# Patient Record
Sex: FEMALE | Race: WHITE | NOT HISPANIC OR LATINO | Employment: FULL TIME | URBAN - METROPOLITAN AREA
[De-identification: names, ages, dates, MRNs, and addresses within clinical notes are randomized per-mention and may not be internally consistent; named-entity substitution may affect disease eponyms.]

---

## 2024-10-14 ENCOUNTER — OFFICE VISIT (OUTPATIENT)
Dept: PRIMARY CARE | Facility: CLINIC | Age: 19
End: 2024-10-14
Payer: COMMERCIAL

## 2024-10-14 VITALS
SYSTOLIC BLOOD PRESSURE: 138 MMHG | DIASTOLIC BLOOD PRESSURE: 84 MMHG | HEIGHT: 65 IN | OXYGEN SATURATION: 98 % | HEART RATE: 93 BPM | WEIGHT: 226 LBS | TEMPERATURE: 97.9 F | BODY MASS INDEX: 37.65 KG/M2

## 2024-10-14 DIAGNOSIS — N92.6 IRREGULAR MENSES: ICD-10-CM

## 2024-10-14 DIAGNOSIS — G43.009 MIGRAINE WITHOUT AURA AND WITHOUT STATUS MIGRAINOSUS, NOT INTRACTABLE: ICD-10-CM

## 2024-10-14 DIAGNOSIS — Z00.00 ROUTINE GENERAL MEDICAL EXAMINATION AT A HEALTH CARE FACILITY: Primary | ICD-10-CM

## 2024-10-14 DIAGNOSIS — E66.812 CLASS 2 OBESITY WITHOUT SERIOUS COMORBIDITY WITH BODY MASS INDEX (BMI) OF 37.0 TO 37.9 IN ADULT, UNSPECIFIED OBESITY TYPE: ICD-10-CM

## 2024-10-14 PROBLEM — R55 POSTURAL DIZZINESS WITH PRESYNCOPE: Status: ACTIVE | Noted: 2024-10-14

## 2024-10-14 PROBLEM — R42 POSTURAL DIZZINESS WITH PRESYNCOPE: Status: ACTIVE | Noted: 2024-10-14

## 2024-10-14 LAB
25(OH)D3 SERPL-MCNC: 23 NG/ML (ref 30–100)
ALBUMIN SERPL BCP-MCNC: 4.5 G/DL (ref 3.4–5)
ALP SERPL-CCNC: 50 U/L (ref 33–110)
ALT SERPL W P-5'-P-CCNC: 10 U/L (ref 7–45)
ANION GAP SERPL CALCULATED.3IONS-SCNC: 11 MMOL/L (ref 10–20)
AST SERPL W P-5'-P-CCNC: 14 U/L (ref 9–39)
BASOPHILS # BLD AUTO: 0.03 X10*3/UL (ref 0–0.1)
BASOPHILS NFR BLD AUTO: 0.6 %
BILIRUB SERPL-MCNC: 0.3 MG/DL (ref 0–1.2)
BUN SERPL-MCNC: 12 MG/DL (ref 6–23)
CALCIUM SERPL-MCNC: 9.7 MG/DL (ref 8.6–10.3)
CHLORIDE SERPL-SCNC: 105 MMOL/L (ref 98–107)
CHOLEST SERPL-MCNC: 172 MG/DL (ref 0–199)
CHOLEST/HDLC SERPL: 3.9 {RATIO}
CO2 SERPL-SCNC: 27 MMOL/L (ref 21–32)
CREAT SERPL-MCNC: 0.77 MG/DL (ref 0.5–1.05)
EGFRCR SERPLBLD CKD-EPI 2021: >90 ML/MIN/1.73M*2
EOSINOPHIL # BLD AUTO: 0.08 X10*3/UL (ref 0–0.7)
EOSINOPHIL NFR BLD AUTO: 1.6 %
ERYTHROCYTE [DISTWIDTH] IN BLOOD BY AUTOMATED COUNT: 12.8 % (ref 11.5–14.5)
GLUCOSE SERPL-MCNC: 96 MG/DL (ref 74–99)
HCT VFR BLD AUTO: 42.6 % (ref 36–46)
HDLC SERPL-MCNC: 44.2 MG/DL
HGB BLD-MCNC: 14.1 G/DL (ref 12–16)
IMM GRANULOCYTES # BLD AUTO: 0.01 X10*3/UL (ref 0–0.7)
IMM GRANULOCYTES NFR BLD AUTO: 0.2 % (ref 0–0.9)
LDLC SERPL CALC-MCNC: 111 MG/DL
LYMPHOCYTES # BLD AUTO: 1.68 X10*3/UL (ref 1.2–4.8)
LYMPHOCYTES NFR BLD AUTO: 34.1 %
MCH RBC QN AUTO: 29.7 PG (ref 26–34)
MCHC RBC AUTO-ENTMCNC: 33.1 G/DL (ref 32–36)
MCV RBC AUTO: 90 FL (ref 80–100)
MONOCYTES # BLD AUTO: 0.33 X10*3/UL (ref 0.1–1)
MONOCYTES NFR BLD AUTO: 6.7 %
NEUTROPHILS # BLD AUTO: 2.8 X10*3/UL (ref 1.2–7.7)
NEUTROPHILS NFR BLD AUTO: 56.8 %
NON HDL CHOLESTEROL: 128 MG/DL (ref 0–149)
NRBC BLD-RTO: 0 /100 WBCS (ref 0–0)
PLATELET # BLD AUTO: 404 X10*3/UL (ref 150–450)
POTASSIUM SERPL-SCNC: 4.4 MMOL/L (ref 3.5–5.3)
PROT SERPL-MCNC: 7.2 G/DL (ref 6.4–8.2)
RBC # BLD AUTO: 4.75 X10*6/UL (ref 4–5.2)
SODIUM SERPL-SCNC: 139 MMOL/L (ref 136–145)
TRIGL SERPL-MCNC: 83 MG/DL (ref 0–149)
VLDL: 17 MG/DL (ref 0–40)
WBC # BLD AUTO: 4.9 X10*3/UL (ref 4.4–11.3)

## 2024-10-14 PROCEDURE — 36415 COLL VENOUS BLD VENIPUNCTURE: CPT | Performed by: REGISTERED NURSE

## 2024-10-14 PROCEDURE — 99385 PREV VISIT NEW AGE 18-39: CPT | Performed by: REGISTERED NURSE

## 2024-10-14 PROCEDURE — 80061 LIPID PANEL: CPT | Performed by: REGISTERED NURSE

## 2024-10-14 PROCEDURE — 1036F TOBACCO NON-USER: CPT | Performed by: REGISTERED NURSE

## 2024-10-14 PROCEDURE — 84075 ASSAY ALKALINE PHOSPHATASE: CPT | Performed by: REGISTERED NURSE

## 2024-10-14 PROCEDURE — 83527 ASSAY OF INSULIN: CPT | Performed by: REGISTERED NURSE

## 2024-10-14 PROCEDURE — 82306 VITAMIN D 25 HYDROXY: CPT | Mod: WESLAB | Performed by: REGISTERED NURSE

## 2024-10-14 PROCEDURE — 3008F BODY MASS INDEX DOCD: CPT | Performed by: REGISTERED NURSE

## 2024-10-14 PROCEDURE — 85025 COMPLETE CBC W/AUTO DIFF WBC: CPT | Performed by: REGISTERED NURSE

## 2024-10-14 RX ORDER — MULTIVITAMIN
1 TABLET ORAL DAILY
COMMUNITY

## 2024-10-14 RX ORDER — SUMATRIPTAN 50 MG/1
50 TABLET, FILM COATED ORAL ONCE AS NEEDED
Qty: 27 TABLET | Refills: 3 | Status: SHIPPED | OUTPATIENT
Start: 2024-10-14 | End: 2025-10-14

## 2024-10-14 ASSESSMENT — ENCOUNTER SYMPTOMS
DEPRESSION: 0
EYE WATERING: 0
FACIAL SWEATING: 0
FEVER: 0
BACK PAIN: 0
TINGLING: 0
SCALP TENDERNESS: 0
COUGH: 0
NAUSEA: 1
LOSS OF SENSATION IN FEET: 0
NECK PAIN: 1
WEAKNESS: 1
VISUAL CHANGE: 1
RHINORRHEA: 0
ABDOMINAL PAIN: 0
WEIGHT LOSS: 0
PHOTOPHOBIA: 1
LOSS OF BALANCE: 0
NUMBNESS: 1
SINUS PRESSURE: 1
ANOREXIA: 1
SWOLLEN GLANDS: 0
SEIZURES: 0
SORE THROAT: 0
INSOMNIA: 1
VOMITING: 1
OCCASIONAL FEELINGS OF UNSTEADINESS: 0
HEADACHES: 1
EYE PAIN: 1
DIZZINESS: 1
BLURRED VISION: 1
ABNORMAL BEHAVIOR: 0
EYE REDNESS: 0

## 2024-10-14 ASSESSMENT — PATIENT HEALTH QUESTIONNAIRE - PHQ9
SUM OF ALL RESPONSES TO PHQ9 QUESTIONS 1 AND 2: 0
1. LITTLE INTEREST OR PLEASURE IN DOING THINGS: NOT AT ALL
2. FEELING DOWN, DEPRESSED OR HOPELESS: NOT AT ALL

## 2024-10-14 ASSESSMENT — COLUMBIA-SUICIDE SEVERITY RATING SCALE - C-SSRS
1. IN THE PAST MONTH, HAVE YOU WISHED YOU WERE DEAD OR WISHED YOU COULD GO TO SLEEP AND NOT WAKE UP?: NO
6. HAVE YOU EVER DONE ANYTHING, STARTED TO DO ANYTHING, OR PREPARED TO DO ANYTHING TO END YOUR LIFE?: NO
2. HAVE YOU ACTUALLY HAD ANY THOUGHTS OF KILLING YOURSELF?: NO

## 2024-10-14 ASSESSMENT — PAIN SCALES - GENERAL: PAINLEVEL: 0-NO PAIN

## 2024-10-14 NOTE — PROGRESS NOTES
Subjective   Patient ID: Kaylyn Singer is a 19 y.o. female who presents for Establish Care (New Pt- Frequent headaches- 3-4 times a week- has been going on for 11 years, pt states it started around when she started her menses. ).    Headache   This is a chronic problem. The current episode started more than 1 year ago. The problem occurs daily. The problem has been gradually worsening. The pain is located in the Right unilateral and retro-orbital region. The pain radiates to the right neck and left neck. The quality of the pain is described as aching, boring, dull, pulsating, stabbing, squeezing and throbbing. The pain is at a severity of 9/10. The pain is severe. Associated symptoms include anorexia, blurred vision, dizziness, eye pain, insomnia, nausea, neck pain, numbness, phonophobia, photophobia, sinus pressure, a visual change, vomiting and weakness. Pertinent negatives include no abdominal pain, abnormal behavior, back pain, coughing, drainage, ear pain, eye redness, eye watering, facial sweating, fever, hearing loss, loss of balance, muscle aches, rhinorrhea, scalp tenderness, seizures, sore throat, swollen glands, tingling, tinnitus or weight loss. The symptoms are aggravated by activity, bright light, emotional stress, exposure to cold air, fatigue, noise, weather changes and work. She has tried acetaminophen, NSAIDs and darkened room for the symptoms. The treatment provided mild relief. Her past medical history is significant for obesity.        Review of Systems   Constitutional:  Negative for fever and weight loss.   HENT:  Positive for sinus pressure. Negative for ear pain, hearing loss, rhinorrhea, sore throat and tinnitus.    Eyes:  Positive for blurred vision, photophobia and pain. Negative for redness.   Respiratory:  Negative for cough.    Gastrointestinal:  Positive for anorexia, nausea and vomiting. Negative for abdominal pain.   Musculoskeletal:  Positive for neck pain. Negative for back  "pain.   Neurological:  Positive for dizziness, weakness, numbness and headaches. Negative for tingling, seizures and loss of balance.   Psychiatric/Behavioral:  The patient has insomnia.    All other systems reviewed and are negative.      Objective   /84 (BP Location: Right arm, Patient Position: Sitting, BP Cuff Size: Adult)   Pulse 93   Temp 36.6 °C (97.9 °F) (Temporal)   Ht 1.651 m (5' 5\")   Wt 103 kg (226 lb)   SpO2 98%   BMI 37.61 kg/m²     Physical Exam  Vitals reviewed.   Constitutional:       Appearance: Normal appearance.   HENT:      Head: Normocephalic and atraumatic.      Right Ear: Tympanic membrane, ear canal and external ear normal.      Left Ear: Tympanic membrane, ear canal and external ear normal.      Nose: Nose normal.      Mouth/Throat:      Mouth: Mucous membranes are moist.   Eyes:      Extraocular Movements: Extraocular movements intact.      Pupils: Pupils are equal, round, and reactive to light.   Cardiovascular:      Rate and Rhythm: Normal rate and regular rhythm.      Pulses: Normal pulses.      Heart sounds: Normal heart sounds.   Pulmonary:      Effort: Pulmonary effort is normal.      Breath sounds: Normal breath sounds.   Abdominal:      General: Bowel sounds are normal.      Palpations: Abdomen is soft.   Musculoskeletal:         General: Normal range of motion.      Cervical back: Normal range of motion and neck supple.   Skin:     General: Skin is warm and dry.      Capillary Refill: Capillary refill takes less than 2 seconds.   Neurological:      General: No focal deficit present.      Mental Status: She is alert and oriented to person, place, and time.   Psychiatric:         Mood and Affect: Mood normal.         Behavior: Behavior normal.         Assessment/Plan   Problem List Items Addressed This Visit             ICD-10-CM    Irregular menses N92.6    Relevant Orders    CBC and Auto Differential     Other Visit Diagnoses         Codes    Routine general medical " examination at a health care facility    -  Primary Z00.00    Relevant Orders    CBC and Auto Differential    Comprehensive Metabolic Panel    Lipid Panel    Insulin, Free And Total    Vitamin D 25-Hydroxy,Total (for eval of Vitamin D levels)    Class 2 obesity without serious comorbidity with body mass index (BMI) of 37.0 to 37.9 in adult, unspecified obesity type     E66.812, Z68.37    Relevant Orders    Comprehensive Metabolic Panel    Lipid Panel    Insulin, Free And Total    Vitamin D 25-Hydroxy,Total (for eval of Vitamin D levels)    Migraine without aura and without status migrainosus, not intractable     G43.009    Relevant Medications    SUMAtriptan (Imitrex) 50 mg tablet

## 2024-10-15 DIAGNOSIS — E55.9 VITAMIN D DEFICIENCY: Primary | ICD-10-CM

## 2024-10-15 RX ORDER — ERGOCALCIFEROL 1.25 MG/1
50000 CAPSULE ORAL
Qty: 12 CAPSULE | Refills: 3 | Status: SHIPPED | OUTPATIENT
Start: 2024-10-15 | End: 2025-01-07

## 2024-10-16 LAB
INSULIN FREE SERPL-ACNC: 18 UIU/ML (ref 3–25)
INSULIN SERPL-ACNC: 25 UIU/ML (ref 3–25)

## 2024-11-08 NOTE — PROGRESS NOTES
"  HPI  Kaylyn Singer is a 19 y.o.  presents for annual GYN well woman exam.     Migraine:   - currently on sumatriptan 50 mg   - unsure if she experiences aura    Known h/o PCOS:   - continued oligomenorrhea  - never been on birth control. Previously considered IUD and decided against it  - evaluated for PCOS 2022  - BMI 37    Annual exam:   Not currently sexually active.   No immediate concern for STIs though amenable to screening.   - last pap, pap hx: never, collected today. STD screening 2022 negative.   - last mammogram, breast hx: n/a  - breast abnormalities: negative for lumps, skin changes, nipple discharge, pain  - last colonoscopy: n/a  - last DEXA scan: n/a    OB hx:    GYN hx:   - menarche, menstrual pattern, LMP: irregular, last menses was 2.5 days and light. Previous menses was 6 days and heavy.   - Sexual activity/issues, STI protection/history, birth control: using condoms, partner is currently overseas in the [employer] so patient is not sexually active.    - HPV vaccine: patient is unsure, but thinks she had them.   FH: No GYN related cancers including breast, ovarian, endometrial, or colon cancer.     ROS notable for weight gain, fatigue, sleep problems, headache.   10 point ROS negative except as listed above.     Physical exam  /80   Ht 1.651 m (5' 5\")   Wt 103 kg (226 lb)   LMP 2024 (Exact Date)   BMI 37.61 kg/m²      Physical Exam  Constitutional:       Appearance: Normal appearance.   HENT:      Head: Normocephalic and atraumatic.   Eyes:      Extraocular Movements: Extraocular movements intact.      Conjunctiva/sclera: Conjunctivae normal.      Pupils: Pupils are equal, round, and reactive to light.   Pulmonary:      Effort: Pulmonary effort is normal.   Chest:   Breasts:     Right: Normal.      Left: Normal.   Abdominal:      General: Abdomen is flat.      Palpations: Abdomen is soft.   Genitourinary:     General: Normal vulva.      Vagina: Normal.      " Cervix: Normal.      Uterus: Normal.       Adnexa: Right adnexa normal and left adnexa normal.   Skin:     General: Skin is warm and dry.   Neurological:      General: No focal deficit present.      Mental Status: She is alert.   Psychiatric:         Mood and Affect: Mood normal.         Behavior: Behavior normal.         Thought Content: Thought content normal.         Judgment: Judgment normal.         Assessment/Plan     Annual exam:   - last pap, pap hx: never, collected today  - GC/CT sent off pap  - last mammogram, breast hx: n/a  - breast exam negative today. Discussed breast self awareness.  - last colonoscopy: n/a  - last DEXA scan: n/a    PCOS:   - discussed the patient's increased risk for endometrial precancer/cancer due to PCOS and oligomenorrhea  - reviewed menstrual chart and compared normal to PCOS physiology  - discussed the need of progesterone therapy for endometrial protection  - educated patient on symptoms and pt to monitor for evidence of aura. If she does have an aura, will risk out of estrogen containing compounds  - provided patient with samples of Slynd, will send a prescription for this to the pharmacy if the patient decides to proceed with this form of treatment  - PA for Kyleena IUD submitted. She is 45% sure she would like the IUD    Scribe Attestation  By signing my name below, IPascale, Scribe   attest that this documentation has been prepared under the direction and in the presence of Ej Crowell MD.

## 2024-11-11 ENCOUNTER — APPOINTMENT (OUTPATIENT)
Dept: OBSTETRICS AND GYNECOLOGY | Facility: CLINIC | Age: 19
End: 2024-11-11
Payer: COMMERCIAL

## 2024-11-11 ENCOUNTER — OFFICE VISIT (OUTPATIENT)
Dept: OBSTETRICS AND GYNECOLOGY | Facility: CLINIC | Age: 19
End: 2024-11-11
Payer: COMMERCIAL

## 2024-11-11 VITALS
SYSTOLIC BLOOD PRESSURE: 116 MMHG | HEIGHT: 65 IN | BODY MASS INDEX: 37.65 KG/M2 | WEIGHT: 226 LBS | DIASTOLIC BLOOD PRESSURE: 80 MMHG

## 2024-11-11 DIAGNOSIS — Z11.3 SCREENING EXAMINATION FOR STD (SEXUALLY TRANSMITTED DISEASE): ICD-10-CM

## 2024-11-11 DIAGNOSIS — N91.4 SECONDARY OLIGOMENORRHEA: ICD-10-CM

## 2024-11-11 DIAGNOSIS — E28.39 SUPPRESSION OF OVULATION: ICD-10-CM

## 2024-11-11 DIAGNOSIS — E28.2 PCOS (POLYCYSTIC OVARIAN SYNDROME): ICD-10-CM

## 2024-11-11 DIAGNOSIS — Z01.419 WOMEN'S ANNUAL ROUTINE GYNECOLOGICAL EXAMINATION: Primary | ICD-10-CM

## 2024-11-11 PROCEDURE — 99213 OFFICE O/P EST LOW 20 MIN: CPT | Performed by: OBSTETRICS & GYNECOLOGY

## 2024-11-11 PROCEDURE — 99395 PREV VISIT EST AGE 18-39: CPT | Performed by: OBSTETRICS & GYNECOLOGY

## 2024-11-11 PROCEDURE — 3008F BODY MASS INDEX DOCD: CPT | Performed by: OBSTETRICS & GYNECOLOGY

## 2024-11-11 PROCEDURE — 1036F TOBACCO NON-USER: CPT | Performed by: OBSTETRICS & GYNECOLOGY

## 2024-11-11 RX ORDER — DROSPIRENONE 4 MG/1
4 TABLET, FILM COATED ORAL DAILY
Qty: 84 TABLET | Refills: 3 | Status: SHIPPED | OUTPATIENT
Start: 2024-11-11 | End: 2024-11-12 | Stop reason: SDUPTHER

## 2024-11-11 NOTE — PATIENT INSTRUCTIONS
Discussed plan of care in detail. Patient is in agreement and expressed understanding. All questions were answered.

## 2024-11-12 ENCOUNTER — TELEPHONE (OUTPATIENT)
Dept: OBSTETRICS AND GYNECOLOGY | Facility: CLINIC | Age: 19
End: 2024-11-12
Payer: COMMERCIAL

## 2024-11-12 DIAGNOSIS — E28.39 SUPPRESSION OF OVULATION: ICD-10-CM

## 2024-11-12 NOTE — TELEPHONE ENCOUNTER
PATIENT WAS RECENTLY SEEN  SLYND WAS SENT TO WRONG PHARMACY, CORRECT PHARMACY IS UPDATED IN CHART.

## 2024-11-13 RX ORDER — DROSPIRENONE 4 MG/1
4 TABLET, FILM COATED ORAL DAILY
Qty: 84 TABLET | Refills: 3 | OUTPATIENT
Start: 2024-11-13

## 2024-11-13 RX ORDER — DROSPIRENONE 4 MG/1
4 TABLET, FILM COATED ORAL DAILY
Qty: 84 TABLET | Refills: 3 | Status: SHIPPED | OUTPATIENT
Start: 2024-11-13

## 2024-12-12 ENCOUNTER — TELEPHONE (OUTPATIENT)
Dept: OBSTETRICS AND GYNECOLOGY | Facility: CLINIC | Age: 19
End: 2024-12-12
Payer: COMMERCIAL

## 2024-12-19 NOTE — PROGRESS NOTES
"  HPI  Kaylyn Singer is a 19 y.o.  presents for repeat pap.  Pap done on 24 was lost at lab.    Not currently sexually active.   No immediate concern for STIs though amenable to screening.   On Slynd OCP, started with recent menses. Doing well.     Hx of Migraine:   - currently on sumatriptan 50 mg   - unsure if she experiences aura    Known h/o PCOS:   - continued oligomenorrhea  - never been on birth control. Previously considered IUD and decided against it  - evaluated for PCOS 2022  - BMI 37    OB hx:    GYN hx:   - menarche, menstrual pattern, LMP: irregular, last menses was 2.5 days and light. Previous menses was 6 days and heavy.   - Sexual activity/issues, STI protection/history, birth control: using condoms, partner is currently overseas in the [employer] so patient is not sexually active.    - HPV vaccine: patient is unsure, but thinks she had them.   FH: No GYN related cancers including breast, ovarian, endometrial, or colon cancer.     ROS notable for weight gain, fatigue, sleep problems, headache.   10 point ROS negative except as listed above.     Physical exam  /78   Ht 1.651 m (5' 5\")   Wt 96.6 kg (213 lb)   BMI 35.45 kg/m²      Physical Exam  Constitutional:       Appearance: Normal appearance.   HENT:      Head: Normocephalic and atraumatic.   Eyes:      Extraocular Movements: Extraocular movements intact.      Conjunctiva/sclera: Conjunctivae normal.      Pupils: Pupils are equal, round, and reactive to light.   Pulmonary:      Effort: Pulmonary effort is normal.   Chest:   Breasts:     Right: Normal.      Left: Normal.   Abdominal:      General: Abdomen is flat.      Palpations: Abdomen is soft.   Genitourinary:     General: Normal vulva.      Vagina: Normal.      Cervix: Normal.      Uterus: Normal.       Adnexa: Right adnexa normal and left adnexa normal.   Skin:     General: Skin is warm and dry.   Neurological:      General: No focal deficit present.      Mental " Status: She is alert.   Psychiatric:         Mood and Affect: Mood normal.         Behavior: Behavior normal.         Thought Content: Thought content normal.         Judgment: Judgment normal.         Assessment/Plan   Pap collected.  GC/CT ordered    No charge visit    Scribe Attestation  By signing my name below, Alyse ORTEGA, Scribe   attest that this documentation has been prepared under the direction and in the presence of Ej Crowell MD.

## 2024-12-20 ENCOUNTER — APPOINTMENT (OUTPATIENT)
Dept: OBSTETRICS AND GYNECOLOGY | Facility: CLINIC | Age: 19
End: 2024-12-20
Payer: COMMERCIAL

## 2024-12-20 VITALS
DIASTOLIC BLOOD PRESSURE: 78 MMHG | BODY MASS INDEX: 35.49 KG/M2 | SYSTOLIC BLOOD PRESSURE: 118 MMHG | WEIGHT: 213 LBS | HEIGHT: 65 IN

## 2024-12-20 DIAGNOSIS — Z12.4 ROUTINE CERVICAL SMEAR: Primary | ICD-10-CM

## 2024-12-20 DIAGNOSIS — Z11.3 SCREEN FOR STD (SEXUALLY TRANSMITTED DISEASE): ICD-10-CM

## 2024-12-20 PROCEDURE — 87491 CHLMYD TRACH DNA AMP PROBE: CPT

## 2024-12-20 PROCEDURE — 87591 N.GONORRHOEAE DNA AMP PROB: CPT

## 2024-12-24 LAB
C TRACH RRNA SPEC QL NAA+PROBE: NEGATIVE
N GONORRHOEA DNA SPEC QL PROBE+SIG AMP: NEGATIVE

## 2025-01-13 LAB
CYTOLOGY CMNT CVX/VAG CYTO-IMP: NORMAL
LAB AP HPV GENOTYPE QUESTION: YES
LAB AP HPV HR: NORMAL
LAB AP PAP ADDITIONAL TESTS: NORMAL
LABORATORY COMMENT REPORT: NORMAL
PATH REPORT.TOTAL CANCER: NORMAL

## 2025-02-03 ENCOUNTER — OFFICE VISIT (OUTPATIENT)
Dept: URGENT CARE | Age: 20
End: 2025-02-03
Payer: COMMERCIAL

## 2025-02-03 VITALS
HEART RATE: 83 BPM | TEMPERATURE: 98.1 F | WEIGHT: 210.54 LBS | DIASTOLIC BLOOD PRESSURE: 88 MMHG | OXYGEN SATURATION: 95 % | SYSTOLIC BLOOD PRESSURE: 131 MMHG | BODY MASS INDEX: 35.08 KG/M2 | HEIGHT: 65 IN | RESPIRATION RATE: 16 BRPM

## 2025-02-03 DIAGNOSIS — J10.1 INFLUENZA A: Primary | ICD-10-CM

## 2025-02-03 DIAGNOSIS — R09.81 NASAL CONGESTION: ICD-10-CM

## 2025-02-03 LAB
POC RAPID INFLUENZA A: POSITIVE
POC RAPID INFLUENZA B: NEGATIVE

## 2025-02-03 RX ORDER — FLUTICASONE PROPIONATE 50 MCG
1 SPRAY, SUSPENSION (ML) NASAL DAILY
Qty: 16 G | Refills: 0 | Status: SHIPPED | OUTPATIENT
Start: 2025-02-03 | End: 2025-02-17

## 2025-02-03 RX ORDER — ERGOCALCIFEROL 1.25 MG/1
1 CAPSULE ORAL
COMMUNITY
Start: 2025-01-07

## 2025-02-03 RX ORDER — OSELTAMIVIR PHOSPHATE 75 MG/1
75 CAPSULE ORAL EVERY 12 HOURS
Qty: 10 CAPSULE | Refills: 0 | Status: SHIPPED | OUTPATIENT
Start: 2025-02-03 | End: 2025-02-08

## 2025-02-03 ASSESSMENT — PAIN SCALES - GENERAL: PAINLEVEL_OUTOF10: 0-NO PAIN

## 2025-02-03 NOTE — LETTER
February 3, 2025     Patient: Kaylyn Singer   YOB: 2005   Date of Visit: 2/3/2025       To Whom It May Concern:    Kaylyn Singer was seen in my clinic on 2/3/2025 at 12:45 pm. Please excuse Kaylyn for her absence from work on this day to make the appointment. She may return on 2/5/25 if fever free for 24 hours and has improving symptoms.    If you have any questions or concerns, please don't hesitate to call.         Sincerely,         Loren Castellon PA-C

## 2025-02-03 NOTE — LETTER
February 3, 2025     Patient: Kaylyn Singer   YOB: 2005   Date of Visit: 2/3/2025       To Whom It May Concern:    Kaylyn Singer was seen in my clinic on 2/3/2025 at 12:45 pm. Please excuse Kaylyn for her absence from school on this day to make the appointment. She may return on 2/5/25 if fever free for 24 hours and has improving symptoms.    If you have any questions or concerns, please don't hesitate to call.         Sincerely,         Loren Castellon PA-C

## 2025-02-03 NOTE — PROGRESS NOTES
Subjective   Patient ID: Kaylyn Singer is a 19 y.o. female. They present today with a chief complaint of Nasal Congestion (Since Saturday night. Had a sore throat but it went away. Exposed to the flu.).    History of Present Illness  19-year-old female presents urgent care for complaint of nasal congestion and sore throat started Saturday night and bodyaches that is since resolved.  States he was exposed to flu.  States she is starting to feel somewhat better.  Denies any current fevers or chills, nausea, vomiting, sweats, chest pain, shortness of breath, abdominal pain.  Influenza A is positive.  Prescribed Tamiflu and Flonase.  Educated on supportive care.  Follow-up with PCP in 1 to 2 weeks.  Return/ER precautions.  Patient agrees with plan.          Past Medical History  Allergies as of 02/03/2025    (No Known Allergies)       (Not in a hospital admission)       Past Medical History:   Diagnosis Date    Body mass index (BMI) pediatric, 85th percentile to less than 95th percentile for age 07/15/2020    Body mass index (BMI) 85th to less than 95th percentile with athletic build, pediatric    Body mass index (BMI) pediatric, greater than or equal to 95th percentile for age 07/31/2018    Body mass index (BMI) 95th percentile or greater with athletic build, pediatric    Body mass index (BMI) pediatric, greater than or equal to 95th percentile for age 07/29/2019    BMI (body mass index), pediatric, greater than or equal to 95% for age    Cellulitis of left upper limb 08/30/2021    Cellulitis of forearm, left    Encounter for immunization 07/30/2021    Encounter for immunization    Encounter for routine child health examination without abnormal findings 07/15/2020    Encounter for routine child health examination without abnormal findings    Pain in left ankle and joints of left foot 10/20/2020    Ankle pain, left    Pain in left knee 01/15/2018    Left knee pain    Pain in unspecified knee 08/10/2020    Chronic  "knee pain    Personal history of other diseases of the female genital tract 07/30/2021    History of irregular menstrual cycles    Personal history of other diseases of the musculoskeletal system and connective tissue 01/15/2018    History of Osgood-Schlatter disease    Personal history of other diseases of the respiratory system 10/05/2018    History of sore throat    Personal history of other specified conditions 10/07/2018    History of fever    Personal history of other specified conditions 12/09/2013    History of snoring    Polycystic ovary syndrome     Sprain of unspecified ligament of left ankle, initial encounter 10/20/2020    Sprain of left ankle, unspecified ligament, initial encounter       No past surgical history on file.     reports that she has never smoked. She has never used smokeless tobacco. She reports that she does not currently use alcohol. She reports that she does not use drugs.    Review of Systems  Review of Systems   All other systems reviewed and are negative.                                 Objective    Vitals:    02/03/25 1442   BP: 131/88   Pulse: 83   Resp: 16   Temp: 36.7 °C (98.1 °F)   SpO2: 95%   Weight: 95.5 kg (210 lb 8.6 oz)   Height: 1.651 m (5' 5\")     Patient's last menstrual period was 01/19/2025 (exact date).    Physical Exam  Vitals reviewed.   Constitutional:       General: She is not in acute distress.     Appearance: Normal appearance. She is not ill-appearing, toxic-appearing or diaphoretic.   HENT:      Head: Normocephalic and atraumatic.      Comments: No sinus tenderness.     Nose: Congestion and rhinorrhea present.      Mouth/Throat:      Mouth: Mucous membranes are moist.      Pharynx: Oropharynx is clear.      Comments: Tonsils/pharynx mildly erythematous without exudate, uvula midline and normal, postnasal drip.  Cardiovascular:      Rate and Rhythm: Normal rate and regular rhythm.   Pulmonary:      Effort: Pulmonary effort is normal. No respiratory distress. "      Breath sounds: Normal breath sounds. No stridor. No wheezing, rhonchi or rales.   Abdominal:      General: Abdomen is flat.      Palpations: Abdomen is soft.      Tenderness: There is no abdominal tenderness. There is no right CVA tenderness or left CVA tenderness.   Musculoskeletal:      Cervical back: Normal range of motion and neck supple. No rigidity or tenderness.   Lymphadenopathy:      Cervical: No cervical adenopathy.   Skin:     General: Skin is warm and dry.   Neurological:      General: No focal deficit present.      Mental Status: She is alert and oriented to person, place, and time.   Psychiatric:         Mood and Affect: Mood normal.         Behavior: Behavior normal.         Procedures    Point of Care Test & Imaging Results from this visit  No results found for this visit on 02/03/25.   No results found.    Diagnostic study results (if any) were reviewed by Sada Castellon PA-C.    Assessment/Plan   Allergies, medications, history, and pertinent labs/EKGs/Imaging reviewed by Sada Castellon PA-C.     Medical Decision Making  19-year-old female presents urgent care for complaint of nasal congestion and sore throat started Saturday night and bodyaches that is since resolved.  States he was exposed to flu.  States she is starting to feel somewhat better.  Denies any current fevers or chills, nausea, vomiting, sweats, chest pain, shortness of breath, abdominal pain.  Influenza A is positive.  Prescribed Tamiflu and Flonase.  Educated on supportive care.  Follow-up with PCP in 1 to 2 weeks.  Return/ER precautions.  Patient agrees with plan.    Orders and Diagnoses  Diagnoses and all orders for this visit:  Nasal congestion  -     POCT Influenza A/B manually resulted      Medical Admin Record      Patient disposition: Home    Electronically signed by Sada Castellon PA-C  2:50 PM